# Patient Record
Sex: MALE | Race: BLACK OR AFRICAN AMERICAN | NOT HISPANIC OR LATINO
[De-identification: names, ages, dates, MRNs, and addresses within clinical notes are randomized per-mention and may not be internally consistent; named-entity substitution may affect disease eponyms.]

---

## 2023-04-05 ENCOUNTER — NON-APPOINTMENT (OUTPATIENT)
Age: 68
End: 2023-04-05

## 2023-04-10 ENCOUNTER — APPOINTMENT (OUTPATIENT)
Dept: UROLOGY | Facility: CLINIC | Age: 68
End: 2023-04-10
Payer: MEDICARE

## 2023-04-10 VITALS
HEART RATE: 80 BPM | TEMPERATURE: 97.3 F | HEIGHT: 68 IN | OXYGEN SATURATION: 99 % | RESPIRATION RATE: 16 BRPM | SYSTOLIC BLOOD PRESSURE: 122 MMHG | DIASTOLIC BLOOD PRESSURE: 79 MMHG | WEIGHT: 190 LBS | BODY MASS INDEX: 28.79 KG/M2

## 2023-04-10 DIAGNOSIS — Z86.79 PERSONAL HISTORY OF OTHER DISEASES OF THE CIRCULATORY SYSTEM: ICD-10-CM

## 2023-04-10 DIAGNOSIS — N18.30 CHRONIC KIDNEY DISEASE, STAGE 3 UNSPECIFIED: ICD-10-CM

## 2023-04-10 DIAGNOSIS — H02.053 TRICHIASIS WITHOUT ENTROPION RIGHT EYE, UNSPECIFIED EYELID: ICD-10-CM

## 2023-04-10 DIAGNOSIS — Z78.9 OTHER SPECIFIED HEALTH STATUS: ICD-10-CM

## 2023-04-10 DIAGNOSIS — L72.9 FOLLICULAR CYST OF THE SKIN AND SUBCUTANEOUS TISSUE, UNSPECIFIED: ICD-10-CM

## 2023-04-10 DIAGNOSIS — Z86.39 PERSONAL HISTORY OF OTHER ENDOCRINE, NUTRITIONAL AND METABOLIC DISEASE: ICD-10-CM

## 2023-04-10 DIAGNOSIS — Z86.73 PERSONAL HISTORY OF TRANSIENT ISCHEMIC ATTACK (TIA), AND CEREBRAL INFARCTION W/OUT RESIDUAL DEFICITS: ICD-10-CM

## 2023-04-10 DIAGNOSIS — M17.9 OSTEOARTHRITIS OF KNEE, UNSPECIFIED: ICD-10-CM

## 2023-04-10 PROBLEM — Z00.00 ENCOUNTER FOR PREVENTIVE HEALTH EXAMINATION: Status: ACTIVE | Noted: 2023-04-10

## 2023-04-10 PROCEDURE — 99204 OFFICE O/P NEW MOD 45 MIN: CPT

## 2023-04-10 RX ORDER — DICLOFENAC SODIUM 1% 10 MG/G
1 GEL TOPICAL
Qty: 100 | Refills: 0 | Status: ACTIVE | COMMUNITY
Start: 2023-04-06

## 2023-04-10 RX ORDER — CLOPIDOGREL 75 MG/1
75 TABLET, FILM COATED ORAL
Refills: 0 | Status: ACTIVE | COMMUNITY

## 2023-04-10 RX ORDER — DICLOFENAC SODIUM 10 MG/G
1 GEL TOPICAL
Refills: 0 | Status: ACTIVE | COMMUNITY

## 2023-04-10 RX ORDER — ASPIRIN 81 MG
81 TABLET, DELAYED RELEASE (ENTERIC COATED) ORAL
Refills: 0 | Status: ACTIVE | COMMUNITY

## 2023-04-10 RX ORDER — AMLODIPINE AND VALSARTAN 10; 160 MG/1; MG/1
10-160 TABLET, FILM COATED ORAL
Qty: 90 | Refills: 0 | Status: ACTIVE | COMMUNITY
Start: 2022-10-31

## 2023-04-10 RX ORDER — ATORVASTATIN CALCIUM 10 MG/1
10 TABLET, FILM COATED ORAL
Qty: 90 | Refills: 0 | Status: ACTIVE | COMMUNITY
Start: 2022-11-28

## 2023-04-10 NOTE — HISTORY OF PRESENT ILLNESS
[FreeTextEntry1] : He is a 67-year-old man who is seen today for erectile dysfunction.  His symptoms have been present for about 6 months.  Rigidity is less than before and he has difficulty maintaining erections sometimes.  Desire is normal.  He has history of diabetes and hypertension which are controlled.  He had a CVA several years ago and no obvious causes were found.  Labs showed in January 2023 PSA 1.25, urinalysis was normal, creatinine 0.82 and cholesterol previously was 156.  He is a non-smoker.  He has 3 children.

## 2023-04-10 NOTE — ASSESSMENT
[FreeTextEntry1] : We discussed the underlying mechanism for erections, pathophysiology of erectile dysfunction (ED) and treatment options. Role of smoking, diabetes, hypertension, hyperlipidemia, coronary artery disease and treatment for benign and malignant prostate conditions was discussed as some of the more common causes of ED. Exercise, weight loss and a healthy lifestyle can be beneficial. We discussed testosterone (T) and its possible link to increased desire for sexual activity, feeling energetic, muscle mass, etc. Low T as a cause for ED is less clear. Mechanism by which PDE-5 inhibitors improve erections was discussed. Medications in this category include Viagra, Levitra, Staxyn, Cialis and Stendra. As needed versus daily dosing was discussed. Also, use of vacuum erection device, urethral suppository (MUSE), intracavernosal injections (Edex, Trimix, Caverject) and surgical placement of inflatable penile prosthesis were discussed in detail.  At this time, low intensity shockwave therapy is considered investigational.  However studies have shown improvement in treatment of mild to moderate vasculogenic erectile dysfunction by regenerative mechanisms including stem cell stimulation and angiogenesis.  Side effects of each treatment was reviewed and questions were answered.  He could try tadalafil 5 to 10 mg as needed.  If he does not respond, will check testosterone level.  He will follow-up in a few months.\par \par Uriel Dumont MD, FACS\par [facility]\par  of Urology\par \par [address], [address]\[place] [address]\par \par [address], [address]\[place] [address]\par \par Tel: [phone]\par Fax: [phone]

## 2023-04-10 NOTE — PHYSICAL EXAM
[General Appearance - Well Developed] : well developed [General Appearance - Well Nourished] : well nourished [Normal Appearance] : normal appearance [Well Groomed] : well groomed [General Appearance - In No Acute Distress] : no acute distress [Edema] : no peripheral edema [Respiration, Rhythm And Depth] : normal respiratory rhythm and effort [Exaggerated Use Of Accessory Muscles For Inspiration] : no accessory muscle use [Abdomen Soft] : soft [Abdomen Tenderness] : non-tender [Costovertebral Angle Tenderness] : no ~M costovertebral angle tenderness [Urethral Meatus] : meatus normal [Penis Abnormality] : normal circumcised penis [Urinary Bladder Findings] : the bladder was normal on palpation [Scrotum] : the scrotum was normal [Testes Tenderness] : no tenderness of the testes [Testes Mass (___cm)] : there were no testicular masses [Normal Station and Gait] : the gait and station were normal for the patient's age [] : no rash [No Focal Deficits] : no focal deficits [Oriented To Time, Place, And Person] : oriented to person, place, and time [Affect] : the affect was normal [Mood] : the mood was normal [Not Anxious] : not anxious [Inguinal Lymph Nodes Enlarged Bilaterally] : inguinal [FreeTextEntry1] : Bilateral varicoceles.

## 2023-04-10 NOTE — LETTER BODY
[Dear  ___] : Dear  [unfilled], [Consult Letter:] : I had the pleasure of evaluating your patient, [unfilled]. [Consult Closing:] : Thank you very much for allowing me to participate in the care of this patient.  If you have any questions, please do not hesitate to contact me. [FreeTextEntry1] : [facility]\par Address: [address], \[place] [address]\par [phone]

## 2023-07-10 ENCOUNTER — APPOINTMENT (OUTPATIENT)
Dept: UROLOGY | Facility: CLINIC | Age: 68
End: 2023-07-10
Payer: MEDICARE

## 2023-07-10 VITALS
OXYGEN SATURATION: 97 % | HEIGHT: 68 IN | SYSTOLIC BLOOD PRESSURE: 126 MMHG | DIASTOLIC BLOOD PRESSURE: 73 MMHG | HEART RATE: 72 BPM | WEIGHT: 190 LBS | BODY MASS INDEX: 28.79 KG/M2

## 2023-07-10 PROCEDURE — 99213 OFFICE O/P EST LOW 20 MIN: CPT

## 2023-07-10 RX ORDER — TADALAFIL 5 MG/1
5 TABLET ORAL
Qty: 90 | Refills: 3 | Status: ACTIVE | COMMUNITY
Start: 2023-04-10 | End: 1900-01-01

## 2023-07-10 NOTE — LETTER BODY
[Dear  ___] : Dear  [unfilled], [Consult Letter:] : I had the pleasure of evaluating your patient, [unfilled]. [Consult Closing:] : Thank you very much for allowing me to participate in the care of this patient.  If you have any questions, please do not hesitate to contact me. [FreeTextEntry1] : [facility]\par Address: [address], \[place] [address]\par [phone]

## 2023-07-10 NOTE — ASSESSMENT
[FreeTextEntry1] : His examination is unchanged.  We discussed different variations of taking tadalafil including 5 mg daily or up to 20 mg as needed.  His questions and concerns were discussed.  His prescription was refilled and he can follow-up in 1 year.\par \par Uriel Dumont MD, FACS\par [facility]\par  of Urology\par \par [address], [address]\[place] [address]\par \par [address]\[place] [address]\par \par Tel: [phone]\par Fax: [phone]

## 2023-07-10 NOTE — PHYSICAL EXAM
[General Appearance - Well Developed] : well developed [General Appearance - Well Nourished] : well nourished [Normal Appearance] : normal appearance [Well Groomed] : well groomed [General Appearance - In No Acute Distress] : no acute distress [Abdomen Soft] : soft [Abdomen Tenderness] : non-tender [Costovertebral Angle Tenderness] : no ~M costovertebral angle tenderness [Urethral Meatus] : meatus normal [Penis Abnormality] : normal circumcised penis [Urinary Bladder Findings] : the bladder was normal on palpation [Scrotum] : the scrotum was normal [Testes Tenderness] : no tenderness of the testes [Testes Mass (___cm)] : there were no testicular masses [FreeTextEntry1] : Bilateral varicoceles.  Genital exam was done previously [] : no respiratory distress [Respiration, Rhythm And Depth] : normal respiratory rhythm and effort [Exaggerated Use Of Accessory Muscles For Inspiration] : no accessory muscle use

## 2023-07-10 NOTE — HISTORY OF PRESENT ILLNESS
[FreeTextEntry1] : He is a 68 year-old man who is seen today for erectile dysfunction.  In April 2023 he was given tadalafil.  He used 10 mg as needed and seems to be relatively satisfied with improvement in erections.  He did not notice significant side effects.\par \par Previous notes: Rigidity is less than before and he has difficulty maintaining erections sometimes.  Desire is normal.  He has history of diabetes and hypertension which are controlled.  He had a CVA several years ago and no obvious causes were found.  Labs showed in January 2023 PSA 1.25, urinalysis was normal, creatinine 0.82 and cholesterol previously was 156.  He is a non-smoker.  He has 3 children.

## 2023-09-07 ENCOUNTER — APPOINTMENT (OUTPATIENT)
Dept: UROLOGY | Facility: CLINIC | Age: 68
End: 2023-09-07
Payer: MEDICARE

## 2023-09-07 DIAGNOSIS — E27.8 OTHER SPECIFIED DISORDERS OF ADRENAL GLAND: ICD-10-CM

## 2023-09-07 DIAGNOSIS — N52.9 MALE ERECTILE DYSFUNCTION, UNSPECIFIED: ICD-10-CM

## 2023-09-07 DIAGNOSIS — N39.0 URINARY TRACT INFECTION, SITE NOT SPECIFIED: ICD-10-CM

## 2023-09-07 PROCEDURE — 99214 OFFICE O/P EST MOD 30 MIN: CPT

## 2023-09-07 NOTE — LETTER BODY
[Dear  ___] : Dear  [unfilled], [Consult Letter:] : I had the pleasure of evaluating your patient, [unfilled]. [Consult Closing:] : Thank you very much for allowing me to participate in the care of this patient.  If you have any questions, please do not hesitate to contact me. [FreeTextEntry1] : [facility]\par  Address: [address], \[place] [address]\par  [phone]

## 2023-09-07 NOTE — HISTORY OF PRESENT ILLNESS
[FreeTextEntry1] : He is a 68 year-old man who is seen today for erectile dysfunction. A few days ago he developed significant urinary frequency, urgency and dysuria.  He also had temperature 101.  He went to emergency room at [facility] and was diagnosed with a urinary tract infection and given cefpodoxime which he is still taking.  Review of records from September 2, 2023 showed urinalysis with white and red blood cells.  CT scan showed air in the bladder and mild sigmoid colon thickening.  There was a incidental 1.4 cm right adrenal nodule and an MRI in the future was recommended. He has used tadalafil 10 mg for erectile dysfunction.  Previous notes: Rigidity is less than before and he has difficulty maintaining erections sometimes.  Desire is normal.  He has history of diabetes and hypertension which are controlled.  He had a CVA several years ago and no obvious causes were found.  Labs showed in January 2023 PSA 1.25, urinalysis was normal, creatinine 0.82 and cholesterol previously was 156.  He is a non-smoker.  He has 3 children.

## 2023-09-07 NOTE — ASSESSMENT
[FreeTextEntry1] : From erectile dysfunction standpoint he will continue with tadalafil.  We reviewed his records from the hospital.  Urine culture was pending at that time when he went to the emergency room.  His symptoms have improved with cefpodoxime.  After finishing antibiotics if symptoms do not fully resolve, he may repeat urine culture. Mild sigmoid thickening was discussed with him.  Also he had a small incidental adrenal nodule.  An MRI was recommended in the future.  I have recommended that he should perhaps follow with endocrinology for further evaluation for his incidental adrenal nodule.  Uriel Dumont MD, FACS [facility]  of Urology  [address], [address] [place] [address]  [address] [place] [address], [address] [id number] [place] [address]  Tel: [phone] Fax: [phone]

## 2023-09-07 NOTE — PHYSICAL EXAM
Refer to podiatry. Please inform. I have signed for the following orders AND/OR meds.  Please call the patient and ask the patient to schedule the testing AND/OR inform about any medications that were sent.      Orders Placed This Encounter   Procedures    Ambulatory referral/consult to Podiatry     Standing Status:   Future     Standing Expiration Date:   7/15/2024     Referral Priority:   Routine     Referral Type:   Consultation     Referral Reason:   Specialty Services Required     Requested Specialty:   Podiatry     Number of Visits Requested:   1            @[email or web address]@    [Urethral Meatus] : meatus normal [Penis Abnormality] : normal circumcised penis [Urinary Bladder Findings] : the bladder was normal on palpation [Scrotum] : the scrotum was normal [Testes Tenderness] : no tenderness of the testes [Testes Mass (___cm)] : there were no testicular masses [General Appearance - Well Developed] : well developed [General Appearance - Well Nourished] : well nourished [Normal Appearance] : normal appearance [Well Groomed] : well groomed [General Appearance - In No Acute Distress] : no acute distress [Abdomen Soft] : soft [Abdomen Tenderness] : non-tender [Costovertebral Angle Tenderness] : no ~M costovertebral angle tenderness [FreeTextEntry1] : Bilateral varicoceles.   [] : no respiratory distress [Respiration, Rhythm And Depth] : normal respiratory rhythm and effort [Exaggerated Use Of Accessory Muscles For Inspiration] : no accessory muscle use [Oriented To Time, Place, And Person] : oriented to person, place, and time [Affect] : the affect was normal [Mood] : the mood was normal [Not Anxious] : not anxious

## 2024-07-18 ENCOUNTER — APPOINTMENT (OUTPATIENT)
Dept: UROLOGY | Facility: CLINIC | Age: 69
End: 2024-07-18
Payer: MEDICARE

## 2024-07-18 DIAGNOSIS — N52.9 MALE ERECTILE DYSFUNCTION, UNSPECIFIED: ICD-10-CM

## 2024-07-18 PROCEDURE — 99214 OFFICE O/P EST MOD 30 MIN: CPT

## 2024-07-18 PROCEDURE — G2211 COMPLEX E/M VISIT ADD ON: CPT

## 2024-07-19 DIAGNOSIS — R97.20 ELEVATED PROSTATE, SPECIFIC ANTIGEN [PSA]: ICD-10-CM

## 2024-07-19 LAB
PSA FREE FLD-MCNC: 25 %
PSA FREE SERPL-MCNC: 0.84 NG/ML
PSA SERPL-MCNC: 3.41 NG/ML

## 2024-07-23 ENCOUNTER — TRANSCRIPTION ENCOUNTER (OUTPATIENT)
Age: 69
End: 2024-07-23

## 2024-09-25 ENCOUNTER — APPOINTMENT (OUTPATIENT)
Dept: UROLOGY | Facility: CLINIC | Age: 69
End: 2024-09-25
Payer: MEDICARE

## 2024-09-25 VITALS
WEIGHT: 190 LBS | OXYGEN SATURATION: 98 % | DIASTOLIC BLOOD PRESSURE: 72 MMHG | SYSTOLIC BLOOD PRESSURE: 131 MMHG | TEMPERATURE: 98 F | HEART RATE: 80 BPM | HEIGHT: 68 IN | BODY MASS INDEX: 28.79 KG/M2 | RESPIRATION RATE: 16 BRPM

## 2024-09-25 DIAGNOSIS — R97.20 ELEVATED PROSTATE, SPECIFIC ANTIGEN [PSA]: ICD-10-CM

## 2024-09-25 DIAGNOSIS — N52.9 MALE ERECTILE DYSFUNCTION, UNSPECIFIED: ICD-10-CM

## 2024-09-25 PROCEDURE — 99213 OFFICE O/P EST LOW 20 MIN: CPT

## 2024-09-25 PROCEDURE — G2211 COMPLEX E/M VISIT ADD ON: CPT

## 2024-09-25 NOTE — LETTER BODY
[Dear  ___] : Dear  [unfilled], [Consult Letter:] : I had the pleasure of evaluating your patient, [unfilled]. [Consult Closing:] : Thank you very much for allowing me to participate in the care of this patient.  If you have any questions, please do not hesitate to contact me. [FreeTextEntry1] : [facility]\par  Address: [address], \[place] [address]\par  [phone]

## 2024-09-25 NOTE — PHYSICAL EXAM
[Urethral Meatus] : meatus normal [Penis Abnormality] : normal circumcised penis [Urinary Bladder Findings] : the bladder was normal on palpation [Scrotum] : the scrotum was normal [Testes Tenderness] : no tenderness of the testes [Testes Mass (___cm)] : there were no testicular masses [FreeTextEntry1] : Bilateral varicoceles., Genital exam was done previously [General Appearance - Well Developed] : well developed [General Appearance - Well Nourished] : well nourished [Normal Appearance] : normal appearance [Well Groomed] : well groomed [] : no respiratory distress [Exaggerated Use Of Accessory Muscles For Inspiration] : no accessory muscle use [Abdomen Soft] : soft [Abdomen Tenderness] : non-tender [Oriented To Time, Place, And Person] : oriented to person, place, and time [Affect] : the affect was normal [Mood] : the mood was normal [Not Anxious] : not anxious

## 2024-09-25 NOTE — ASSESSMENT
[FreeTextEntry1] : He will continue with tadalafil 5 mg as needed.  He does not have significant voiding symptoms.  PSA levels have fluctuated and it was recently again elevated.  He will undergo MRI of the prostate to assess for any lesions which may require fusion prostate biopsy.  We will discuss results on the phone.  Uriel Dumont MD, FACS [facility]  of Urology [address], [address] [place] [address] Tel: [phone] Fax: [phone]

## 2024-09-25 NOTE — HISTORY OF PRESENT ILLNESS
[FreeTextEntry1] : He is a 69 year-old man who is seen today for erectile dysfunction and elevated PSA level.  In July 2024 PSA was 3.4 with 25% free PSA.  When it was checked by his oncologist in September it was over 4, according to the patient.  PSA level was 5.2 in February 2024.  He thinks his father may have had prostate cancer.  He uses tadalafil 5 mg for erectile dysfunction.  Previous notes: In 2023, he developed significant urinary frequency, urgency and dysuria.  He also had temperature 101.  He went to emergency room at [facility] and was diagnosed with a urinary tract infection and given cefpodoxime.  Review of records from September 2, 2023 showed urinalysis with white and red blood cells.  CT scan showed air in the bladder and mild sigmoid colon thickening.  There was an incidental 1.4 cm right adrenal nodule.   He has history of diabetes and hypertension which are controlled.  He had a CVA several years ago and no obvious causes were found.  Labs showed in January 2023 PSA 1.25, urinalysis was normal, creatinine 0.82 and cholesterol previously was 156.  He is a non-smoker.  He has 3 children.

## 2024-10-16 ENCOUNTER — RESULT REVIEW (OUTPATIENT)
Age: 69
End: 2024-10-16

## 2024-10-16 ENCOUNTER — OUTPATIENT (OUTPATIENT)
Dept: OUTPATIENT SERVICES | Facility: HOSPITAL | Age: 69
LOS: 1 days | End: 2024-10-16
Payer: COMMERCIAL

## 2024-10-16 ENCOUNTER — APPOINTMENT (OUTPATIENT)
Dept: MRI IMAGING | Facility: IMAGING CENTER | Age: 69
End: 2024-10-16
Payer: MEDICARE

## 2024-10-16 DIAGNOSIS — R97.20 ELEVATED PROSTATE SPECIFIC ANTIGEN [PSA]: ICD-10-CM

## 2024-10-16 PROCEDURE — 76498 UNLISTED MR PROCEDURE: CPT

## 2024-10-16 PROCEDURE — A9585: CPT

## 2024-10-16 PROCEDURE — 72197 MRI PELVIS W/O & W/DYE: CPT

## 2024-10-16 PROCEDURE — 72197 MRI PELVIS W/O & W/DYE: CPT | Mod: 26

## 2024-10-16 PROCEDURE — 76498P: CUSTOM | Mod: 26

## 2024-10-18 ENCOUNTER — APPOINTMENT (OUTPATIENT)
Dept: MRI IMAGING | Facility: CLINIC | Age: 69
End: 2024-10-18

## 2024-10-21 DIAGNOSIS — R97.20 ELEVATED PROSTATE, SPECIFIC ANTIGEN [PSA]: ICD-10-CM

## 2024-11-21 ENCOUNTER — OUTPATIENT (OUTPATIENT)
Dept: OUTPATIENT SERVICES | Facility: HOSPITAL | Age: 69
LOS: 1 days | End: 2024-11-21

## 2024-11-21 VITALS
WEIGHT: 197.09 LBS | OXYGEN SATURATION: 99 % | SYSTOLIC BLOOD PRESSURE: 115 MMHG | DIASTOLIC BLOOD PRESSURE: 69 MMHG | TEMPERATURE: 98 F | HEIGHT: 67.5 IN | RESPIRATION RATE: 16 BRPM | HEART RATE: 75 BPM

## 2024-11-21 DIAGNOSIS — R97.20 ELEVATED PROSTATE SPECIFIC ANTIGEN [PSA]: ICD-10-CM

## 2024-11-21 DIAGNOSIS — E78.5 HYPERLIPIDEMIA, UNSPECIFIED: ICD-10-CM

## 2024-11-21 DIAGNOSIS — I63.9 CEREBRAL INFARCTION, UNSPECIFIED: ICD-10-CM

## 2024-11-21 DIAGNOSIS — I10 ESSENTIAL (PRIMARY) HYPERTENSION: ICD-10-CM

## 2024-11-21 DIAGNOSIS — G47.33 OBSTRUCTIVE SLEEP APNEA (ADULT) (PEDIATRIC): ICD-10-CM

## 2024-11-21 NOTE — H&P PST ADULT - LAST ECHOCARDIOGRAM
2023 Dr ELISEO Hsieh, was done as a part of routine examination. As per pat it was normal 2023 Dr ELISEO Hsieh, was done as a part of routine examination. As per pt it was normal

## 2024-11-21 NOTE — H&P PST ADULT - NSICDXPASTMEDICALHX_GEN_ALL_CORE_FT
PAST MEDICAL HISTORY:  CVA (cerebrovascular accident)     H/O erectile dysfunction     Hyperlipidemia     Hypertension

## 2024-11-21 NOTE — H&P PST ADULT - CARDIOVASCULAR COMMENTS
h/o CVA in 2021, No weakness, On Aspirin 81 mg h/o CVA in 2021, No weakness, On Aspirin 81 mg, H/o HTN, Hyperlipidemia- Maintained on meds

## 2024-11-21 NOTE — H&P PST ADULT - PROBLEM SELECTOR PLAN 1
Scheduled for Transperineal fusion prostate biopsy on 12/3/24.   Pre-op instructions provided. Pt given verbal and written instructions pepcid. Pt verbalized understanding.  Urine culture done. Results pending

## 2024-11-21 NOTE — H&P PST ADULT - PROBLEM SELECTOR PLAN 5
Patient was placed on aspirin 81 mg. Last dose 11/26/24. Patient was last seen by neurologist Sept 10th 2024. Patient does not recall any recent MRI Patient was placed on aspirin 81 mg. Instructed to continue Aspirin. Patient was last seen by neurologist Sept 10th 2024. Patient does not recall any recent MRI

## 2024-11-21 NOTE — H&P PST ADULT - HISTORY OF PRESENT ILLNESS
69 year-old man with h/o erectile dysfunction, HTN, Hyperlipidemia, CVA 2021 (no weakness)  and preop dx of elevated prostate specific antigen psa presents to have PST eval for Transperineal fusion prostate biopsy.     In July 2024 PSA was 3.4 with 25% free PSA. When it was checked by his oncologist in September it was over 4, according to the patient. PSA level was 5.2 in February 2024. He thinks his father may have had prostate cancer.   ?  In 2023, he developed significant urinary frequency, urgency and dysuria. He also had temperature 101. He went to emergency room at [facility] and was diagnosed with a urinary tract infection and given cefpodoxime. Review of records from September 2, 2023 showed urinalysis with white and red blood cells. CT scan showed air in the bladder and mild sigmoid colon thickening. There was an incidental 1.4 cm right adrenal nodule.  ?

## 2024-11-21 NOTE — H&P PST ADULT - ENMT COMMENTS
No loose teeth, Mallampati II denies loose teeth or dentures No loose teeth, Mallampati Unable to visualize

## 2024-11-22 DIAGNOSIS — R97.20 ELEVATED PROSTATE, SPECIFIC ANTIGEN [PSA]: ICD-10-CM

## 2024-11-22 LAB
CULTURE RESULTS: SIGNIFICANT CHANGE UP
SPECIMEN SOURCE: SIGNIFICANT CHANGE UP

## 2024-11-23 ENCOUNTER — OUTPATIENT (OUTPATIENT)
Dept: OUTPATIENT SERVICES | Facility: HOSPITAL | Age: 69
LOS: 1 days | End: 2024-11-23

## 2024-11-23 DIAGNOSIS — Z00.8 ENCOUNTER FOR OTHER GENERAL EXAMINATION: ICD-10-CM

## 2024-11-23 PROBLEM — I63.9 CEREBRAL INFARCTION, UNSPECIFIED: Chronic | Status: ACTIVE | Noted: 2024-11-21

## 2024-11-23 PROBLEM — Z87.438 PERSONAL HISTORY OF OTHER DISEASES OF MALE GENITAL ORGANS: Chronic | Status: ACTIVE | Noted: 2024-11-21

## 2024-11-23 PROBLEM — E78.5 HYPERLIPIDEMIA, UNSPECIFIED: Chronic | Status: ACTIVE | Noted: 2024-11-21

## 2024-11-23 PROBLEM — I10 ESSENTIAL (PRIMARY) HYPERTENSION: Chronic | Status: ACTIVE | Noted: 2024-11-21

## 2024-11-25 ENCOUNTER — OUTPATIENT (OUTPATIENT)
Dept: OUTPATIENT SERVICES | Facility: HOSPITAL | Age: 69
LOS: 1 days | End: 2024-11-25
Payer: COMMERCIAL

## 2024-11-25 DIAGNOSIS — R97.20 ELEVATED PROSTATE SPECIFIC ANTIGEN [PSA]: ICD-10-CM

## 2024-11-25 PROCEDURE — C8001: CPT

## 2024-12-02 ENCOUNTER — NON-APPOINTMENT (OUTPATIENT)
Age: 69
End: 2024-12-02

## 2024-12-03 ENCOUNTER — TRANSCRIPTION ENCOUNTER (OUTPATIENT)
Age: 69
End: 2024-12-03

## 2024-12-03 ENCOUNTER — RESULT REVIEW (OUTPATIENT)
Age: 69
End: 2024-12-03

## 2024-12-03 ENCOUNTER — APPOINTMENT (OUTPATIENT)
Dept: UROLOGY | Facility: AMBULATORY SURGERY CENTER | Age: 69
End: 2024-12-03

## 2024-12-03 ENCOUNTER — OUTPATIENT (OUTPATIENT)
Dept: OUTPATIENT SERVICES | Facility: HOSPITAL | Age: 69
LOS: 1 days | Discharge: HOME | End: 2024-12-03
Payer: MEDICARE

## 2024-12-03 VITALS
DIASTOLIC BLOOD PRESSURE: 76 MMHG | TEMPERATURE: 98 F | OXYGEN SATURATION: 100 % | RESPIRATION RATE: 17 BRPM | HEART RATE: 75 BPM | SYSTOLIC BLOOD PRESSURE: 156 MMHG

## 2024-12-03 VITALS
RESPIRATION RATE: 16 BRPM | HEART RATE: 75 BPM | DIASTOLIC BLOOD PRESSURE: 69 MMHG | HEIGHT: 67.5 IN | SYSTOLIC BLOOD PRESSURE: 115 MMHG | TEMPERATURE: 98 F | WEIGHT: 197.09 LBS | OXYGEN SATURATION: 99 %

## 2024-12-03 DIAGNOSIS — R97.20 ELEVATED PROSTATE SPECIFIC ANTIGEN [PSA]: ICD-10-CM

## 2024-12-03 PROCEDURE — 55706 BX PRST8 NDL SAT SAMPLING: CPT

## 2024-12-03 PROCEDURE — 88344 IMHCHEM/IMCYTCHM EA MLT ANTB: CPT | Mod: 26

## 2024-12-03 PROCEDURE — G0416: CPT | Mod: 26

## 2024-12-03 PROCEDURE — 76999F: CUSTOM | Mod: 26

## 2024-12-03 RX ORDER — FAMOTIDINE 20 MG/1
1 TABLET, FILM COATED ORAL
Refills: 0 | DISCHARGE

## 2024-12-03 RX ORDER — AMLODIPINE AND VALSARTAN 5; 160 MG/1; MG/1
1 TABLET, FILM COATED ORAL
Refills: 0 | DISCHARGE

## 2024-12-03 RX ORDER — YOHIMBE BARK 500 MG
1 CAPSULE ORAL
Refills: 0 | DISCHARGE

## 2024-12-03 NOTE — BRIEF OPERATIVE NOTE - OPERATION/FINDINGS
The patient was prepped and positioned. Local anesthesia was injected into the perineum. 3 biopsies were taken from the MRI target lesion. A systematic 12 core (right posterior medial apex, right posterior medial base, right posterior lateral apex, right posterior lateral base, right lateral, right anterior, left anterior, left lateral , left posterior lateral apex, left posterior lateral base, left posterior medial apex, left posterior medial base) biopsy was then performed. The biopsy site was covered with bacitracin and dressing.

## 2024-12-03 NOTE — ASU DISCHARGE PLAN (ADULT/PEDIATRIC) - CARE PROVIDER_API CALL
Uriel Dumont  Urology  [address] [place] [address], [facility] [address]  [place] [address]  Phone: [phone]  Fax: [phone]  Follow Up Time: Routine

## 2024-12-03 NOTE — ASU DISCHARGE PLAN (ADULT/PEDIATRIC) - CALL YOUR DOCTOR IF YOU HAVE ANY OF THE FOLLOWING:
100.4F/Bleeding that does not stop/Swelling that gets worse/Pain not relieved by Medications/Fever greater than (need to indicate Fahrenheit or Celsius)/Wound/Surgical Site with redness, or foul smelling discharge or pus/Unable to urinate

## 2024-12-03 NOTE — ASU DISCHARGE PLAN (ADULT/PEDIATRIC) - ASU DC SPECIAL INSTRUCTIONSFT
You may see blood in your urine for up to 1 week and blood in your semen for up to 1 month. This is normal after the procedure. Please let your urologist know if you see a large volume of blood or clots in either.     You have a dressing over the biopsy site, which you may remove in 3-4 hours. You may shower normally after taking off the dressing.     You may take over-the-counter Tylenol and Motrin every 6 hours as needed for pain control or discomfort. You may alternate taking these medications so that you take one every 3 hours (e.g., Motrin at 9:00am, Tylenol at 12:00pm, Motrin at 3:00pm, Tylenol at 6:00pm, Motrin at 9:00pm, etc.).     You may return to your usual level of activity and diet.    Dr. Dumont's office will call you with the results of the biopsy.

## 2024-12-04 ENCOUNTER — NON-APPOINTMENT (OUTPATIENT)
Age: 69
End: 2024-12-04

## 2024-12-05 ENCOUNTER — NON-APPOINTMENT (OUTPATIENT)
Age: 69
End: 2024-12-05

## 2024-12-09 LAB — SURGICAL PATHOLOGY STUDY: SIGNIFICANT CHANGE UP

## 2024-12-16 ENCOUNTER — APPOINTMENT (OUTPATIENT)
Dept: UROLOGY | Facility: CLINIC | Age: 69
End: 2024-12-16
Payer: MEDICARE

## 2024-12-16 DIAGNOSIS — R97.20 ELEVATED PROSTATE, SPECIFIC ANTIGEN [PSA]: ICD-10-CM

## 2024-12-16 PROCEDURE — G2211 COMPLEX E/M VISIT ADD ON: CPT

## 2024-12-16 PROCEDURE — 99213 OFFICE O/P EST LOW 20 MIN: CPT

## 2025-03-26 ENCOUNTER — APPOINTMENT (OUTPATIENT)
Dept: UROLOGY | Facility: CLINIC | Age: 70
End: 2025-03-26

## 2025-06-16 ENCOUNTER — APPOINTMENT (OUTPATIENT)
Dept: UROLOGY | Facility: CLINIC | Age: 70
End: 2025-06-16
Payer: MEDICARE

## 2025-06-16 VITALS
OXYGEN SATURATION: 99 % | RESPIRATION RATE: 16 BRPM | SYSTOLIC BLOOD PRESSURE: 115 MMHG | DIASTOLIC BLOOD PRESSURE: 56 MMHG | HEART RATE: 85 BPM

## 2025-06-16 PROCEDURE — 99214 OFFICE O/P EST MOD 30 MIN: CPT

## 2025-06-16 PROCEDURE — G2211 COMPLEX E/M VISIT ADD ON: CPT

## 2025-06-16 RX ORDER — SILDENAFIL 100 MG/1
100 TABLET, FILM COATED ORAL
Qty: 10 | Refills: 6 | Status: ACTIVE | COMMUNITY
Start: 2025-06-16 | End: 1900-01-01

## 2025-06-17 LAB — PSA SERPL-MCNC: 3.43 NG/ML

## 2025-08-24 ENCOUNTER — NON-APPOINTMENT (OUTPATIENT)
Age: 70
End: 2025-08-24

## (undated) DEVICE — POSITIONER FOAM EGG CRATE ULNAR 2PCS (PINK)

## (undated) DEVICE — NDL SPINAL 20G X 6" QUINCKE

## (undated) DEVICE — DRSG TELFA 3 X 8

## (undated) DEVICE — WARMING BLANKET UPPER ADULT

## (undated) DEVICE — VENODYNE/SCD SLEEVE CALF MEDIUM

## (undated) DEVICE — GRID BRACHYTHERAPY EZ 18G

## (undated) DEVICE — GLV 7.5 PROTEXIS (BLUE)

## (undated) DEVICE — PREP CHLORAPREP HI-LITE ORANGE 3ML

## (undated) DEVICE — SYR LUER LOK 20CC

## (undated) DEVICE — Device

## (undated) DEVICE — CATH ANGIOCATH 14G

## (undated) DEVICE — NDL MAX CORE 18G X 25CM

## (undated) DEVICE — NEOGUARD ENDOCAVITY PROBE COVER 1 X 11.8"

## (undated) DEVICE — GLV 7.5 PROTEXIS (WHITE)

## (undated) DEVICE — BALLOON ENDOCAVITY 2X14CM

## (undated) DEVICE — BASIN SET DOUBLE